# Patient Record
Sex: FEMALE | Race: ASIAN | NOT HISPANIC OR LATINO | Employment: UNEMPLOYED | URBAN - METROPOLITAN AREA
[De-identification: names, ages, dates, MRNs, and addresses within clinical notes are randomized per-mention and may not be internally consistent; named-entity substitution may affect disease eponyms.]

---

## 2019-10-18 ASSESSMENT — MIFFLIN-ST. JEOR
SCORE: 1232.57
SCORE: 1232.57

## 2019-10-20 ENCOUNTER — ANESTHESIA - HEALTHEAST (OUTPATIENT)
Dept: SURGERY | Facility: AMBULATORY SURGERY CENTER | Age: 24
End: 2019-10-20

## 2019-10-21 ENCOUNTER — HOSPITAL ENCOUNTER (OUTPATIENT)
Dept: SURGERY | Facility: AMBULATORY SURGERY CENTER | Age: 24
Discharge: HOME | End: 2019-10-21
Attending: OBSTETRICS & GYNECOLOGY | Admitting: OBSTETRICS & GYNECOLOGY

## 2019-10-21 ENCOUNTER — SURGERY - HEALTHEAST (OUTPATIENT)
Dept: SURGERY | Facility: AMBULATORY SURGERY CENTER | Age: 24
End: 2019-10-21

## 2019-10-21 LAB
DIPSTICK EXPIRATION DATE - HISTORICAL: NORMAL
DIPSTICK LOT NUMBER - HISTORICAL: NORMAL
POC PREG URINE (HCG) HE - HISTORICAL: NEGATIVE
POC SPECIFIC GRAVITY, URINE - HISTORICAL: NORMAL
POCT KIT EXPIRATION DATE - HISTORICAL: NORMAL
POCT KIT LOT NUMBER HE - HISTORICAL: NORMAL
POCT NEGATIVE CONTROL HE - HISTORICAL: NORMAL
POCT POSITIVE CONTROL HE - HISTORICAL: NORMAL

## 2019-10-21 ASSESSMENT — MIFFLIN-ST. JEOR
SCORE: 1232.57
SCORE: 1232.57

## 2021-01-28 ENCOUNTER — COMMUNICATION - HEALTHEAST (OUTPATIENT)
Dept: SCHEDULING | Facility: CLINIC | Age: 26
End: 2021-01-28

## 2021-06-02 NOTE — ANESTHESIA POSTPROCEDURE EVALUATION
Patient: Lumeena Demi  HYSTEROSCOPY INTRAUTERINE DEVICE REMOVAL, INTRAUTERINE DEVICE REMOVAL  Anesthesia type: MAC    Patient location: PACU  Last vitals:   Vitals Value Taken Time   /74 10/21/2019  9:45 AM   Temp 36.1  C (97  F) 10/21/2019  9:02 AM   Pulse 58 10/21/2019  9:48 AM   Resp 16 10/21/2019  9:43 AM   SpO2 98 % 10/21/2019  9:48 AM   Vitals shown include unvalidated device data.  Post vital signs: stable  Level of consciousness: awake and responds to simple questions  Post-anesthesia pain: pain controlled  Post-anesthesia nausea and vomiting: no  Pulmonary: unassisted, return to baseline  Cardiovascular: stable and blood pressure at baseline  Hydration: adequate  Anesthetic events: no    QCDR Measures:  ASA# 11 - Debra-op Cardiac Arrest: ASA11B - Patient did NOT experience unanticipated cardiac arrest  ASA# 12 - Debra-op Mortality Rate: ASA12B - Patient did NOT die  ASA# 13 - PACU Re-Intubation Rate: ASA13B - Patient did NOT require a new airway mgmt  ASA# 10 - Composite Anes Safety: ASA10A - No serious adverse event    Additional Notes:

## 2021-06-02 NOTE — ANESTHESIA CARE TRANSFER NOTE
Last vitals:   Vitals:    10/21/19 0902   BP: 98/57   Pulse: 66   Resp: 16   Temp: 36.1  C (97  F)   SpO2: 98%     Patient's level of consciousness is drowsy  Spontaneous respirations: yes  Maintains airway independently: yes  Dentition unchanged: yes  Oropharynx: oropharynx clear of all foreign objects    QCDR Measures:  ASA# 20 - Surgical Safety Checklist: WHO surgical safety checklist completed prior to induction    PQRS# 430 - Adult PONV Prevention: 4558F - Pt received => 2 anti-emetic agents (different classes) preop & intraop  ASA# 8 - Peds PONV Prevention: NA - Not pediatric patient, not GA or 2 or more risk factors NOT present  PQRS# 424 - Derba-op Temp Management: 4559F - At least one body temp DOCUMENTED => 35.5C or 95.9F within required timeframe  PQRS# 426 - PACU Transfer Protocol: - Transfer of care checklist used  ASA# 14 - Acute Post-op Pain: ASA14B - Patient did NOT experience pain >= 7 out of 10

## 2021-06-02 NOTE — ANESTHESIA PREPROCEDURE EVALUATION
Anesthesia Evaluation      Patient summary reviewed   No history of anesthetic complications     Airway   Mallampati: II  Neck ROM: full   Pulmonary - negative ROS and normal exam                          Cardiovascular - negative ROS   Neuro/Psych - negative ROS     Endo/Other - negative ROS      GI/Hepatic/Renal - negative ROS           Dental - normal exam                        Anesthesia Plan  Planned anesthetic: MAC    ASA 1   Induction: intravenous   Anesthetic plan and risks discussed with: patient    Post-op plan: routine recovery

## 2021-06-02 NOTE — H&P
"Assessment/Plan:     Pt is a 25yo here with retained IUD. Despite attempts in the office it was unable to be removed. The patient has had a tvus to confirm placement in the uterus. Strings were unable to be visualized. Attempts made in the office were unsuccessful at removal. IUD has been in place for 5 years. Will move forward with hysteroscopy and removal in the OR. Pt have been counseled on risks/benefits/adverse events. She agrees to the plan of care.         Counseling: Procedure, risks, reasons, benefits and complications (including injury to bowel, bladder, major blood vessel, ureter, bleeding, possibility of transfusion, infection, or fistula formation) reviewed in detail. Consent signed.  Preop testing ordered.        Subjective:     Patient is a 24 y.o. female scheduled for hysteroscopy removal of iud. Indications for procedure are failed removal in office..    Onset of symptoms was no symptoms .. Previous studies include TVUS which shoed in place IUD.    Pertinent Gynecological History:  Menses: absent with IUD  Bleeding: none  Contraception: IUD  DARYL exposure: denies  Blood transfusions: none  Sexually transmitted diseases: no past history  Last mammogram:  Date: N/A  Last pap: normal      Menstrual History:  OB History    No obstetric history on file.             The following portions of the patient's history were reviewed and updated as appropriate: allergies, current medications, past family history, past medical history, past social history, past surgical history and problem list.    Review of Systems  Pertinent items are noted in HPI.      Objective:     /71   Pulse 71   Temp 98  F (36.7  C) (Temporal)   Resp 16   Ht 5' 2\" (1.575 m)   Wt 120 lb (54.4 kg)   SpO2 99%   BMI 21.95 kg/m      General:   alert, appears stated age and cooperative   Skin:   normal   HEENT:  neck supple with midline trachea   Lungs:   non labored   Heart:   regular rate and rhythm, S1, S2 normal, no murmur, " click, rub or gallop   Breasts:   self-exam is taught and encouraged   Abdomen:  soft, non-tender; bowel sounds normal; no masses,  no organomegaly   Pelvis:  Exam deferred.  deffered to OR                                           Hayley Saavedra DO

## 2021-06-02 NOTE — OP NOTE
PROCEDURE NOTE - HYSTEROSCOPY AND DILATION AND CURETTAGE     Name: Godfrey Simms   : 1995   MRN: [id number]     DATE OF SERVICE:  10/21/2019     PREOPERATIVE DIAGNOSIS: Retained IUD    POSTOPERATIVE DIAGNOSIS:Retained IUD    PROCEDURES: Hysteroscopy, removal of IUD    SURGEON: Hayley Saavedra     ASSISTANT: OR staff    ANESTHESIA:  mac    ESTIMATED BLOOD LOSS:   5cc  HISTORY OF PRESENT ILLNESS:  This is a 24 y.o. female with history ofretained IUD. She had a transvaginal ultrasound prior to which showed   IUD in correct anatomical place in the uterus.  She was given informed consent for the above procedure. The risks, benefits and alternatives were discussed with her including but not exclusive to uterine perforation, bleeding and infection. She expressed understanding and wished to proceed.     PROCEDURE:  The patient was brought to the operating room and after induction of MAC anesthesia was prepped and draped in the dorsal lithotomy position. A time out was called and the patient and the procedure were verified.  A bimanual exam was done, indicating   6 wks midline uterus. No other abnormalities were noted.     A sterile weighted speculum was then placed. The anterior lip of the cervix was grasped with a single tooth tenaculum. Uterine cavity was then sounded to 6.5cm. The cervix was gently dilated using Elizabeth dilators and the hysteroscope was introduced without difficulty. The cavity of the uterus appeared within normal, the IUD was identifed with the strings curled around the device. The hysteroscope was removed.The device was then removed using polyp forceps. The hysteroscope was reinserted and the uterine cavity was visualized again, the bilateral ostia were identified and there were no abnormalities present.  At this point good hemostasis was noted and the hysteroscope was removed once again. Instruments were removed from vagina. No active bleeding was noted. Sponge and needle counts were correct X 2.  She was taken to recovery in stable condition.    Hayley Saavedra DO       CC: Provider, No Primary Care, Hayley Saavedra

## 2021-06-03 VITALS
HEIGHT: 62 IN | BODY MASS INDEX: 22.08 KG/M2 | WEIGHT: 120 LBS | HEIGHT: 62 IN | WEIGHT: 120 LBS | BODY MASS INDEX: 22.08 KG/M2

## 2021-06-16 PROBLEM — Z34.90 PREGNANT: Status: ACTIVE | Noted: 2021-01-28

## 2021-11-30 LAB
HEMOGLOBIN (EXTERNAL): 13.1 G/DL (ref 11.7–15.5)
HEPATITIS B SURFACE ANTIGEN (EXTERNAL): NONREACTIVE
HEPATITIS C ANTIBODY (EXTERNAL): NONREACTIVE
HIV1+2 AB SERPL QL IA: NONREACTIVE
RUBELLA ANTIBODY IGG (EXTERNAL): POSITIVE
VDRL (SYPHILIS) (EXTERNAL): NONREACTIVE

## 2021-12-01 ENCOUNTER — MEDICAL CORRESPONDENCE (OUTPATIENT)
Dept: HEALTH INFORMATION MANAGEMENT | Facility: CLINIC | Age: 26
End: 2021-12-01

## 2021-12-01 ENCOUNTER — TRANSCRIBE ORDERS (OUTPATIENT)
Dept: MATERNAL FETAL MEDICINE | Facility: HOSPITAL | Age: 26
End: 2021-12-01

## 2021-12-01 DIAGNOSIS — O26.90 PREGNANCY RELATED CONDITION, ANTEPARTUM: Primary | ICD-10-CM

## 2022-01-07 ENCOUNTER — TRANSFERRED RECORDS (OUTPATIENT)
Dept: HEALTH INFORMATION MANAGEMENT | Facility: CLINIC | Age: 27
End: 2022-01-07
Payer: COMMERCIAL

## 2022-01-10 ENCOUNTER — PRE VISIT (OUTPATIENT)
Dept: MATERNAL FETAL MEDICINE | Facility: HOSPITAL | Age: 27
End: 2022-01-10
Payer: COMMERCIAL

## 2022-01-17 ENCOUNTER — OFFICE VISIT (OUTPATIENT)
Dept: MATERNAL FETAL MEDICINE | Facility: HOSPITAL | Age: 27
End: 2022-01-17
Attending: MIDWIFE
Payer: COMMERCIAL

## 2022-01-17 ENCOUNTER — ANCILLARY PROCEDURE (OUTPATIENT)
Dept: ULTRASOUND IMAGING | Facility: HOSPITAL | Age: 27
End: 2022-01-17
Attending: MIDWIFE
Payer: COMMERCIAL

## 2022-01-17 DIAGNOSIS — O26.90 PREGNANCY RELATED CONDITION, ANTEPARTUM: Primary | ICD-10-CM

## 2022-01-17 DIAGNOSIS — O26.90 PREGNANCY RELATED CONDITION, ANTEPARTUM: ICD-10-CM

## 2022-01-17 PROCEDURE — 99207 PR NO CHARGE LOS: CPT | Performed by: OBSTETRICS & GYNECOLOGY

## 2022-01-17 PROCEDURE — 76805 OB US >/= 14 WKS SNGL FETUS: CPT

## 2022-01-17 PROCEDURE — 76805 OB US >/= 14 WKS SNGL FETUS: CPT | Mod: 26 | Performed by: OBSTETRICS & GYNECOLOGY

## 2022-01-17 NOTE — PROGRESS NOTES
"Please see \"Imaging\" tab under \"Chart Review\" for details of today's US.    Esperanza Matt, DO    "

## 2022-02-21 ENCOUNTER — ANCILLARY PROCEDURE (OUTPATIENT)
Dept: ULTRASOUND IMAGING | Facility: HOSPITAL | Age: 27
End: 2022-02-21
Attending: OBSTETRICS & GYNECOLOGY
Payer: COMMERCIAL

## 2022-02-21 ENCOUNTER — OFFICE VISIT (OUTPATIENT)
Dept: MATERNAL FETAL MEDICINE | Facility: HOSPITAL | Age: 27
End: 2022-02-21
Attending: OBSTETRICS & GYNECOLOGY
Payer: COMMERCIAL

## 2022-02-21 DIAGNOSIS — O35.9XX0 SUSPECTED FETAL ANOMALY, ANTEPARTUM, SINGLE OR UNSPECIFIED FETUS: Primary | ICD-10-CM

## 2022-02-21 DIAGNOSIS — O26.90 PREGNANCY RELATED CONDITION, ANTEPARTUM: ICD-10-CM

## 2022-02-21 PROCEDURE — 99207 PR NO CHARGE LOS: CPT | Performed by: OBSTETRICS & GYNECOLOGY

## 2022-02-21 PROCEDURE — 76811 OB US DETAILED SNGL FETUS: CPT

## 2022-02-21 PROCEDURE — 76811 OB US DETAILED SNGL FETUS: CPT | Mod: 26 | Performed by: OBSTETRICS & GYNECOLOGY

## 2022-02-23 NOTE — PROGRESS NOTES
"Please see \"Imaging\" tab under \"Chart Review\" for details of today's visit.    Pricilla Echavarria    "

## 2022-04-05 ENCOUNTER — HOSPITAL ENCOUNTER (OUTPATIENT)
Facility: HOSPITAL | Age: 27
End: 2022-04-05
Admitting: ADVANCED PRACTICE MIDWIFE
Payer: COMMERCIAL

## 2022-04-05 ENCOUNTER — HOSPITAL ENCOUNTER (OUTPATIENT)
Facility: HOSPITAL | Age: 27
Discharge: HOME | End: 2022-04-05
Attending: ADVANCED PRACTICE MIDWIFE | Admitting: ADVANCED PRACTICE MIDWIFE
Payer: COMMERCIAL

## 2022-04-05 VITALS
HEIGHT: 62 IN | WEIGHT: 135 LBS | DIASTOLIC BLOOD PRESSURE: 57 MMHG | SYSTOLIC BLOOD PRESSURE: 101 MMHG | BODY MASS INDEX: 24.84 KG/M2 | TEMPERATURE: 98.7 F | RESPIRATION RATE: 16 BRPM

## 2022-04-05 PROCEDURE — G0463 HOSPITAL OUTPT CLINIC VISIT: HCPCS

## 2022-04-05 RX ORDER — VITAMIN A ACETATE, .BETA.-CAROTENE, ASCORBIC ACID, CHOLECALCIFEROL, .ALPHA.-TOCOPHEROL ACETATE, DL-, THIAMINE MONONITRATE, RIBOFLAVIN, NIACINAMIDE, PYRIDOXINE HYDROCHLORIDE, FOLIC ACID, CYANOCOBALAMIN, CALCIUM CARBONATE, FERROUS FUMARATE, ZINC OXIDE, AND CUPRIC OXIDE 2000; 2000; 120; 400; 22; 1.84; 3; 20; 10; 1; 12; 200; 27; 25; 2 [IU]/1; [IU]/1; MG/1; [IU]/1; MG/1; MG/1; MG/1; MG/1; MG/1; MG/1; UG/1; MG/1; MG/1; MG/1; MG/1
1 TABLET ORAL DAILY
COMMUNITY

## 2022-04-05 NOTE — DISCHARGE INSTRUCTIONS
Discharge Instruction for Undelivered Patients      You were seen for: Bleeding Assessment  We Consulted:Suzanne Holguin CNM  You had (Test or Medicine):***     Diet:   Drink 8 to 12 glasses of liquids (milk, juice, water) every day.  You may eat meals and snacks.     Activity:  Count fetal kicks everyday (see handout)     Call your provider if you notice:  Swelling in your face or increased swelling in your hands or legs.  Headaches that are not relieved by Tylenol (acetaminophen).  Changes in your vision (blurring: seeing spots or stars.)  Nausea (sick to your stomach) and vomiting (throwing up).   Weight gain of 5 pounds or more per week.  Heartburn that doesn't go away.  Signs of bladder infection: pain when you urinate (use the toilet), need to go more often and more urgently.  The bag of bryan (rupture of membranes) breaks, or you notice leaking in your underwear.  Bright red blood in your underwear.  Abdominal (lower belly) or stomach pain.  For first baby: Contractions (tightening) less than 5 minutes apart for one hour or more.  Second (plus) baby: Contractions (tightening) less than 10 minutes apart and getting stronger.  *If less than 34 weeks: Contractions (tightening) more than 6 times in one hour.  Increase or change in vaginal discharge (note the color and amount)  Other: ***    Follow-up:  As scheduled in the clinic
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
## 2022-04-21 LAB — HEMOGLOBIN (EXTERNAL): 11.9 G/DL (ref 11.7–15.5)

## 2022-07-15 ENCOUNTER — TRANSFERRED RECORDS (OUTPATIENT)
Dept: HEALTH INFORMATION MANAGEMENT | Facility: CLINIC | Age: 27
End: 2022-07-15

## 2022-07-15 ENCOUNTER — HOSPITAL ENCOUNTER (INPATIENT)
Facility: HOSPITAL | Age: 27
LOS: 2 days | Discharge: HOME | End: 2022-07-17
Attending: ADVANCED PRACTICE MIDWIFE | Admitting: ADVANCED PRACTICE MIDWIFE
Payer: COMMERCIAL

## 2022-07-15 PROBLEM — O42.90 RUPTURE OF MEMBRANES WITH DELAY OF DELIVERY: Status: ACTIVE | Noted: 2022-07-15

## 2022-07-15 LAB
ABO/RH(D): NORMAL
ANTIBODY SCREEN: NEGATIVE
HOLD SPECIMEN: NORMAL
HOLD SPECIMEN: NORMAL
SARS-COV-2 RNA RESP QL NAA+PROBE: NEGATIVE
SPECIMEN EXPIRATION DATE: NORMAL
T PALLIDUM AB SER QL: NONREACTIVE

## 2022-07-15 PROCEDURE — 36415 COLL VENOUS BLD VENIPUNCTURE: CPT | Performed by: ADVANCED PRACTICE MIDWIFE

## 2022-07-15 PROCEDURE — 87635 SARS-COV-2 COVID-19 AMP PRB: CPT | Performed by: ADVANCED PRACTICE MIDWIFE

## 2022-07-15 PROCEDURE — 86780 TREPONEMA PALLIDUM: CPT | Performed by: ADVANCED PRACTICE MIDWIFE

## 2022-07-15 PROCEDURE — 250N000011 HC RX IP 250 OP 636: Performed by: ADVANCED PRACTICE MIDWIFE

## 2022-07-15 PROCEDURE — 250N000013 HC RX MED GY IP 250 OP 250 PS 637: Performed by: ADVANCED PRACTICE MIDWIFE

## 2022-07-15 PROCEDURE — 86901 BLOOD TYPING SEROLOGIC RH(D): CPT | Performed by: ADVANCED PRACTICE MIDWIFE

## 2022-07-15 PROCEDURE — 250N000009 HC RX 250: Performed by: ADVANCED PRACTICE MIDWIFE

## 2022-07-15 PROCEDURE — 722N000001 HC LABOR CARE VAGINAL DELIVERY SINGLE

## 2022-07-15 PROCEDURE — 258N000003 HC RX IP 258 OP 636: Performed by: ADVANCED PRACTICE MIDWIFE

## 2022-07-15 PROCEDURE — 120N000001 HC R&B MED SURG/OB

## 2022-07-15 RX ORDER — NALOXONE HYDROCHLORIDE 0.4 MG/ML
0.2 INJECTION, SOLUTION INTRAMUSCULAR; INTRAVENOUS; SUBCUTANEOUS
Status: DISCONTINUED | OUTPATIENT
Start: 2022-07-15 | End: 2022-07-15 | Stop reason: HOSPADM

## 2022-07-15 RX ORDER — OXYTOCIN 10 [USP'U]/ML
10 INJECTION, SOLUTION INTRAMUSCULAR; INTRAVENOUS
Status: DISCONTINUED | OUTPATIENT
Start: 2022-07-15 | End: 2022-07-17 | Stop reason: HOSPADM

## 2022-07-15 RX ORDER — NALOXONE HYDROCHLORIDE 0.4 MG/ML
0.4 INJECTION, SOLUTION INTRAMUSCULAR; INTRAVENOUS; SUBCUTANEOUS
Status: DISCONTINUED | OUTPATIENT
Start: 2022-07-15 | End: 2022-07-15 | Stop reason: HOSPADM

## 2022-07-15 RX ORDER — IBUPROFEN 800 MG/1
800 TABLET, FILM COATED ORAL
Status: DISCONTINUED | OUTPATIENT
Start: 2022-07-15 | End: 2022-07-17 | Stop reason: HOSPADM

## 2022-07-15 RX ORDER — MISOPROSTOL 200 UG/1
800 TABLET ORAL
Status: DISCONTINUED | OUTPATIENT
Start: 2022-07-15 | End: 2022-07-15 | Stop reason: HOSPADM

## 2022-07-15 RX ORDER — MISOPROSTOL 200 UG/1
400 TABLET ORAL
Status: DISCONTINUED | OUTPATIENT
Start: 2022-07-15 | End: 2022-07-17 | Stop reason: HOSPADM

## 2022-07-15 RX ORDER — MISOPROSTOL 200 UG/1
800 TABLET ORAL
Status: DISCONTINUED | OUTPATIENT
Start: 2022-07-15 | End: 2022-07-17 | Stop reason: HOSPADM

## 2022-07-15 RX ORDER — PROCHLORPERAZINE MALEATE 10 MG
10 TABLET ORAL EVERY 6 HOURS PRN
Status: DISCONTINUED | OUTPATIENT
Start: 2022-07-15 | End: 2022-07-15 | Stop reason: HOSPADM

## 2022-07-15 RX ORDER — ACETAMINOPHEN 500 MG
500-1000 TABLET ORAL EVERY 6 HOURS PRN
Status: ON HOLD | COMMUNITY
End: 2023-10-23

## 2022-07-15 RX ORDER — ONDANSETRON 2 MG/ML
4 INJECTION INTRAMUSCULAR; INTRAVENOUS EVERY 6 HOURS PRN
Status: DISCONTINUED | OUTPATIENT
Start: 2022-07-15 | End: 2022-07-15 | Stop reason: HOSPADM

## 2022-07-15 RX ORDER — CARBOPROST TROMETHAMINE 250 UG/ML
250 INJECTION, SOLUTION INTRAMUSCULAR
Status: DISCONTINUED | OUTPATIENT
Start: 2022-07-15 | End: 2022-07-17 | Stop reason: HOSPADM

## 2022-07-15 RX ORDER — NALOXONE HYDROCHLORIDE 0.4 MG/ML
0.4 INJECTION, SOLUTION INTRAMUSCULAR; INTRAVENOUS; SUBCUTANEOUS
Status: DISCONTINUED | OUTPATIENT
Start: 2022-07-15 | End: 2022-07-17 | Stop reason: HOSPADM

## 2022-07-15 RX ORDER — OXYTOCIN/0.9 % SODIUM CHLORIDE 30/500 ML
340 PLASTIC BAG, INJECTION (ML) INTRAVENOUS CONTINUOUS PRN
Status: DISCONTINUED | OUTPATIENT
Start: 2022-07-15 | End: 2022-07-17 | Stop reason: HOSPADM

## 2022-07-15 RX ORDER — CARBOPROST TROMETHAMINE 250 UG/ML
250 INJECTION, SOLUTION INTRAMUSCULAR
Status: DISCONTINUED | OUTPATIENT
Start: 2022-07-15 | End: 2022-07-15 | Stop reason: HOSPADM

## 2022-07-15 RX ORDER — PENICILLIN G 3000000 [IU]/50ML
3 INJECTION, SOLUTION INTRAVENOUS EVERY 4 HOURS
Status: DISCONTINUED | OUTPATIENT
Start: 2022-07-15 | End: 2022-07-15 | Stop reason: HOSPADM

## 2022-07-15 RX ORDER — KETOROLAC TROMETHAMINE 30 MG/ML
30 INJECTION, SOLUTION INTRAMUSCULAR; INTRAVENOUS
Status: DISCONTINUED | OUTPATIENT
Start: 2022-07-15 | End: 2022-07-17 | Stop reason: HOSPADM

## 2022-07-15 RX ORDER — ACETAMINOPHEN 325 MG/1
650 TABLET ORAL EVERY 4 HOURS PRN
Status: DISCONTINUED | OUTPATIENT
Start: 2022-07-15 | End: 2022-07-15 | Stop reason: HOSPADM

## 2022-07-15 RX ORDER — OXYTOCIN 10 [USP'U]/ML
10 INJECTION, SOLUTION INTRAMUSCULAR; INTRAVENOUS
Status: DISCONTINUED | OUTPATIENT
Start: 2022-07-15 | End: 2022-07-15 | Stop reason: HOSPADM

## 2022-07-15 RX ORDER — HYDROCORTISONE 25 MG/G
CREAM TOPICAL 3 TIMES DAILY PRN
Status: DISCONTINUED | OUTPATIENT
Start: 2022-07-15 | End: 2022-07-17 | Stop reason: HOSPADM

## 2022-07-15 RX ORDER — ONDANSETRON 4 MG/1
4 TABLET, ORALLY DISINTEGRATING ORAL EVERY 6 HOURS PRN
Status: DISCONTINUED | OUTPATIENT
Start: 2022-07-15 | End: 2022-07-15 | Stop reason: HOSPADM

## 2022-07-15 RX ORDER — IBUPROFEN 800 MG/1
800 TABLET, FILM COATED ORAL EVERY 6 HOURS PRN
Status: DISCONTINUED | OUTPATIENT
Start: 2022-07-15 | End: 2022-07-17 | Stop reason: HOSPADM

## 2022-07-15 RX ORDER — OXYTOCIN/0.9 % SODIUM CHLORIDE 30/500 ML
340 PLASTIC BAG, INJECTION (ML) INTRAVENOUS CONTINUOUS PRN
Status: DISCONTINUED | OUTPATIENT
Start: 2022-07-15 | End: 2022-07-15 | Stop reason: HOSPADM

## 2022-07-15 RX ORDER — OXYCODONE HYDROCHLORIDE 5 MG/1
5 TABLET ORAL EVERY 4 HOURS PRN
Status: DISCONTINUED | OUTPATIENT
Start: 2022-07-15 | End: 2022-07-17 | Stop reason: HOSPADM

## 2022-07-15 RX ORDER — CITRIC ACID/SODIUM CITRATE 334-500MG
30 SOLUTION, ORAL ORAL
Status: DISCONTINUED | OUTPATIENT
Start: 2022-07-15 | End: 2022-07-15 | Stop reason: HOSPADM

## 2022-07-15 RX ORDER — METOCLOPRAMIDE HYDROCHLORIDE 5 MG/ML
10 INJECTION INTRAMUSCULAR; INTRAVENOUS EVERY 6 HOURS PRN
Status: DISCONTINUED | OUTPATIENT
Start: 2022-07-15 | End: 2022-07-15 | Stop reason: HOSPADM

## 2022-07-15 RX ORDER — MODIFIED LANOLIN
OINTMENT (GRAM) TOPICAL
Status: DISCONTINUED | OUTPATIENT
Start: 2022-07-15 | End: 2022-07-17 | Stop reason: HOSPADM

## 2022-07-15 RX ORDER — METHYLERGONOVINE MALEATE 0.2 MG/ML
200 INJECTION INTRAVENOUS
Status: DISCONTINUED | OUTPATIENT
Start: 2022-07-15 | End: 2022-07-15 | Stop reason: HOSPADM

## 2022-07-15 RX ORDER — MISOPROSTOL 200 UG/1
400 TABLET ORAL
Status: DISCONTINUED | OUTPATIENT
Start: 2022-07-15 | End: 2022-07-15 | Stop reason: HOSPADM

## 2022-07-15 RX ORDER — NALOXONE HYDROCHLORIDE 0.4 MG/ML
0.2 INJECTION, SOLUTION INTRAMUSCULAR; INTRAVENOUS; SUBCUTANEOUS
Status: DISCONTINUED | OUTPATIENT
Start: 2022-07-15 | End: 2022-07-17 | Stop reason: HOSPADM

## 2022-07-15 RX ORDER — ACETAMINOPHEN 325 MG/1
650 TABLET ORAL EVERY 4 HOURS PRN
Status: DISCONTINUED | OUTPATIENT
Start: 2022-07-15 | End: 2022-07-17 | Stop reason: HOSPADM

## 2022-07-15 RX ORDER — PENICILLIN G POTASSIUM 5000000 [IU]/1
5 INJECTION, POWDER, FOR SOLUTION INTRAMUSCULAR; INTRAVENOUS ONCE
Status: COMPLETED | OUTPATIENT
Start: 2022-07-15 | End: 2022-07-15

## 2022-07-15 RX ORDER — PROCHLORPERAZINE 25 MG
25 SUPPOSITORY, RECTAL RECTAL EVERY 12 HOURS PRN
Status: DISCONTINUED | OUTPATIENT
Start: 2022-07-15 | End: 2022-07-15 | Stop reason: HOSPADM

## 2022-07-15 RX ORDER — DOCUSATE SODIUM 100 MG/1
100 CAPSULE, LIQUID FILLED ORAL DAILY
Status: DISCONTINUED | OUTPATIENT
Start: 2022-07-15 | End: 2022-07-17 | Stop reason: HOSPADM

## 2022-07-15 RX ORDER — OXYTOCIN/0.9 % SODIUM CHLORIDE 30/500 ML
100-340 PLASTIC BAG, INJECTION (ML) INTRAVENOUS CONTINUOUS PRN
Status: DISCONTINUED | OUTPATIENT
Start: 2022-07-15 | End: 2022-07-17 | Stop reason: HOSPADM

## 2022-07-15 RX ORDER — METOCLOPRAMIDE 10 MG/1
10 TABLET ORAL EVERY 6 HOURS PRN
Status: DISCONTINUED | OUTPATIENT
Start: 2022-07-15 | End: 2022-07-15 | Stop reason: HOSPADM

## 2022-07-15 RX ORDER — BISACODYL 10 MG
10 SUPPOSITORY, RECTAL RECTAL DAILY PRN
Status: DISCONTINUED | OUTPATIENT
Start: 2022-07-15 | End: 2022-07-17 | Stop reason: HOSPADM

## 2022-07-15 RX ORDER — METHYLERGONOVINE MALEATE 0.2 MG/ML
200 INJECTION INTRAVENOUS
Status: DISCONTINUED | OUTPATIENT
Start: 2022-07-15 | End: 2022-07-17 | Stop reason: HOSPADM

## 2022-07-15 RX ORDER — SODIUM CHLORIDE, SODIUM LACTATE, POTASSIUM CHLORIDE, CALCIUM CHLORIDE 600; 310; 30; 20 MG/100ML; MG/100ML; MG/100ML; MG/100ML
INJECTION, SOLUTION INTRAVENOUS CONTINUOUS
Status: DISCONTINUED | OUTPATIENT
Start: 2022-07-15 | End: 2022-07-15 | Stop reason: HOSPADM

## 2022-07-15 RX ADMIN — PENICILLIN G POTASSIUM 5 MILLION UNITS: 5000000 POWDER, FOR SOLUTION INTRAMUSCULAR; INTRAPLEURAL; INTRATHECAL; INTRAVENOUS at 02:43

## 2022-07-15 RX ADMIN — DOCUSATE SODIUM 100 MG: 100 CAPSULE, LIQUID FILLED ORAL at 14:30

## 2022-07-15 RX ADMIN — ACETAMINOPHEN 650 MG: 325 TABLET, FILM COATED ORAL at 07:13

## 2022-07-15 RX ADMIN — IBUPROFEN 800 MG: 800 TABLET ORAL at 14:30

## 2022-07-15 RX ADMIN — SODIUM CHLORIDE, POTASSIUM CHLORIDE, SODIUM LACTATE AND CALCIUM CHLORIDE: 600; 310; 30; 20 INJECTION, SOLUTION INTRAVENOUS at 02:25

## 2022-07-15 RX ADMIN — Medication 340 ML/HR: at 03:32

## 2022-07-15 RX ADMIN — KETOROLAC TROMETHAMINE 30 MG: 30 INJECTION, SOLUTION INTRAMUSCULAR; INTRAVENOUS at 04:04

## 2022-07-15 RX ADMIN — IBUPROFEN 800 MG: 800 TABLET ORAL at 20:48

## 2022-07-15 RX ADMIN — ACETAMINOPHEN 650 MG: 325 TABLET, FILM COATED ORAL at 14:52

## 2022-07-15 RX ADMIN — ACETAMINOPHEN 650 MG: 325 TABLET, FILM COATED ORAL at 20:48

## 2022-07-15 ASSESSMENT — ACTIVITIES OF DAILY LIVING (ADL)
ADLS_ACUITY_SCORE: 22
WEAR_GLASSES_OR_BLIND: NO
ADLS_ACUITY_SCORE: 22
CHANGE_IN_FUNCTIONAL_STATUS_SINCE_ONSET_OF_CURRENT_ILLNESS/INJURY: NO
DRESSING/BATHING_DIFFICULTY: NO
FALL_HISTORY_WITHIN_LAST_SIX_MONTHS: NO
ADLS_ACUITY_SCORE: 22
ADLS_ACUITY_SCORE: 35
DIFFICULTY_EATING/SWALLOWING: NO
DOING_ERRANDS_INDEPENDENTLY_DIFFICULTY: NO
ADLS_ACUITY_SCORE: 22
WALKING_OR_CLIMBING_STAIRS_DIFFICULTY: NO
TOILETING_ISSUES: NO
ADLS_ACUITY_SCORE: 22
CONCENTRATING,_REMEMBERING_OR_MAKING_DECISIONS_DIFFICULTY: NO
ADLS_ACUITY_SCORE: 22

## 2022-07-15 NOTE — PLAN OF CARE
Godfrey is dizzy when she sits up. She was able to void x1 mid morning, used sit to stand to get to bathroom. Ate meal midmorning. Has been trying to rest most of the day but hasnt been able to sleep. Plan is to nap and re-assess if she is still feeling dizzy when she gets up.     Postpartum assessment otherwise WNL. Pain managed with Ibuprofen and Tylenol PRN.     PPMA, BC and ROP need to be completed- at bedside. Tdap up to date.     Patient has been instructed to use the call light if she needs to get up or if/when baby needs anything. She is alone this afternoon, her  is coming back later today.       Problem: Bleeding (Postpartum Vaginal Delivery)  Goal: Hemostasis  Outcome: Ongoing, Progressing     Problem: Infection (Postpartum Vaginal Delivery)  Goal: Absence of Infection Signs/Symptoms  Outcome: Ongoing, Progressing

## 2022-07-15 NOTE — PLAN OF CARE
Godfrey napped this afternoon for approximately 2 hours and feels much better.  Her  has been at the bedside this afternoon and attentive.      Pt reporting painful cramping at a 7/10.  Pain otherwise was an acceptable 3/10.  Fundus was found to be to right of umbilicus and firm following fundal massage.  Large clots expressed.  Pt then emptied her bladder and fundus was firm and midline, bleeding was minimal with no clots, and pain from cramping was greatly reduced.  Pt encouraged to empty bladder frequently, every 1-2 hours, this evening, and report any additional clots or painful cramping.  Saline lock left in place for now.        Problem: Bleeding (Postpartum Vaginal Delivery)  Goal: Hemostasis  Outcome: Ongoing, Progressing

## 2022-07-15 NOTE — H&P
HISTORY AND PHYSICAL UPDATE ADMISSION EXAM    Name: Godfrey Simms  YOB: 1995  Medical Record Number: [id number]    History of Present Illness: Godfrey Simms is a 27 year old female who is 39w6d pregnant and being admitted for active labor management, SROM and antibiotic therapy.    Estimated Date of Delivery: Data Unavailable    EGA 39w6d    OB History    Para Term  AB Living   6 4 4 0 2 4   SAB IAB Ectopic Multiple Live Births   2 0 0 0 4      # Outcome Date GA Lbr Abraham/2nd Weight Sex Delivery Anes PTL Lv   6 Term 07/15/22 39w6d 06:24 / 00:02 3.5 kg (7 lb 11.5 oz) M Vag-Spont None N RANJAN      Name: BEAU SIMMS      Apgar1: 7  Apgar5: 8   5 Term 21 39w6d 04:00 / 00:12 3.68 kg (8 lb 1.8 oz) M Vag-Spont None N RANJAN      Name: BEAU SIMMS      Apgar1: 9  Apgar5: 9   4 SAB 2020     SAB      3 Term 2014 41w0d  3.487 kg (7 lb 11 oz)     RANJAN   2 Term 2013 42w0d  3.487 kg (7 lb 11 oz)     RANJAN   1 2012     SAB           Lab Results   Component Value Date    AS Negative 07/15/2022    HGB 12.6 2021       Prenatal Complications: GBS pos in urine    Exam:      /80   Temp 99.3  F (37.4  C) (Oral)   Resp 16   LMP 2021   Breastfeeding Unknown     Fetal heart Rate Category 1  Contractions q 2 minutes    HEENT grossly normal  Neck: no lymphadenopathy or thryoidomegaly  Lungs no resp distress  Heart RRR  ABD gravid, non-tender  EXT:  mild edema, moves freely  Vaginal exam 7cm per RN  Membranes: SROM  clear amniotic fluid    Assessment: active labor management, SROM and antibiotic therapy    Plan: Admit - see IP orders  Prophylactic antibiotic for + GBS status  MD consultant on call Dr Arguello/ available prn  Anticipate     Prenatal record reviewed.    MARIN Joyce CNM      7/15/2022   4:32 AM

## 2022-07-15 NOTE — L&D DELIVERY NOTE
Vaginal Delivery Note    Name: Godfrey Simms  : 1995  MRN: [id number]    PRE DELIVERY DIAGNOSIS  1) 27 year old  6Para 4024 at 39w6d      2) GBS pos in urine    POST DELIVERY DIAGNOSIS  1) 27 year old  @ 39w6d  2) Delivery of a viable infant weighing  7#11oz via     YOB: 2023     Birth Time: 3:26 AM       Augmentation No              Indication: none  In    duction No                      Indication:none    Monitors: External     GBS: Positive    ROM: SROM  Fluid Type: Clear    Labor Analgesia/Anesthesia:Non-pharmacologic    Cord pH obtained: No  Placenta Date/Time: 7/15/2022  3:30 AM   Placenta submitted to Pathology: No    Description of procedure:   27 year old  with PNC w/ MWC and pregnancy complicated by GBS  pos in urine presented to L&D with spontaneous rupture of membranes and labor contractions, 7cm.  Her hospital course was uncomplicated.  Patient progressed to complete with position changes.  Shoulder Dystocia No  Operative Vaginal Delivery No  Infant spontaneously delivered over an intact perineum.   Infant delivered in PAWEL position.  Nuchal cord yes   Delivered through    Placenta spontaneously delivered: 7/15/2022  3:30 AM  grossly intact with 3 vessel cord.  Infant:  Live, vigorous infant  was handed to mom.    Delivery was complicated by nothing Interventions included fundal massage and pitocin.    Delivery QBL (mL): 100    Mother and Infant stable.    MARIN Joyce [facility]7/15/2022 4:25 AM

## 2022-07-15 NOTE — PLAN OF CARE
Problem: Plan of Care - These are the overarching goals to be used throughout the patient stay.    Goal: Plan of Care Review/Shift Note  Description: The Plan of Care Review/Shift note should be completed every shift.  The Outcome Evaluation is a brief statement about your assessment that the patient is improving, declining, or no change.  This information will be displayed automatically on your shift note.  Outcome: Ongoing, Progressing     Problem: Plan of Care - These are the overarching goals to be used throughout the patient stay.    Goal: Optimal Comfort and Wellbeing  Outcome: Ongoing, Progressing  Intervention: Monitor Pain and Promote Comfort  Recent Flowsheet Documentation  Taken 7/15/2022 0530 by Kiersten Plascencia RN  Pain Management Interventions: care clustered  Intervention: Provide Person-Centered Care  Recent Flowsheet Documentation  Taken 7/15/2022 0311 by Kiersten Plascencia RN  Trust Relationship/Rapport:   care explained   choices provided   emotional support provided   empathic listening provided   questions answered   questions encouraged   reassurance provided   thoughts/feelings acknowledged     Problem: Adjustment to Role Transition (Postpartum Vaginal Delivery)  Goal: Successful Maternal Role Transition  Outcome: Ongoing, Progressing  Intervention: Support Maternal Role Transition  Recent Flowsheet Documentation  Taken 7/15/2022 0318 by Kiersten Plascencia RN  Supportive Measures:   verbalization of feelings encouraged   positive reinforcement provided   active listening utilized  Parent/Child Attachment Promotion:   face-to-face positioning promoted   interaction encouraged   positive reinforcement provided   rooming-in promoted   strengths emphasized   parent/caregiver presence encouraged   participation in care promoted   cue recognition promoted     Problem: Bleeding (Postpartum Vaginal Delivery)  Goal: Hemostasis  Outcome: Ongoing, Progressing  Intervention: Monitor and Manage Postpartum  Bleeding  Recent Flowsheet Documentation  Taken 7/15/2022 0432 by Kiersten Plascencia RN   Bleed Management: Rh status confirmed     Problem: Pain (Postpartum Vaginal Delivery)  Goal: Acceptable Pain Control  Outcome: Ongoing, Progressing  Intervention: Prevent or Manage Pain  Recent Flowsheet Documentation  Taken 7/15/2022 0530 by Kiersten Plascencia RN  Pain Management Interventions: care clustered   Pt presented @ 39w6d with SROM cl fl and in active labor. GBS+ PCN was started but pt delivered before completion. VSS, NST reactive and CNM requested intermittent FHT. Pt progressed quickly. , intact perineum, given Toradol IV states has a little uterine cramping. FOB is present and supportive, good family bonding and support observed. Stable postpartum recovery, pt was incontinent of urine in labor and is due to void. Will call when ready to get up.

## 2022-07-16 PROCEDURE — 120N000001 HC R&B MED SURG/OB

## 2022-07-16 PROCEDURE — 250N000013 HC RX MED GY IP 250 OP 250 PS 637: Performed by: ADVANCED PRACTICE MIDWIFE

## 2022-07-16 RX ORDER — IBUPROFEN 800 MG/1
800 TABLET, FILM COATED ORAL EVERY 8 HOURS PRN
Qty: 30 TABLET | Refills: 0 | Status: ON HOLD | OUTPATIENT
Start: 2022-07-16 | End: 2023-10-22

## 2022-07-16 RX ORDER — DOCUSATE SODIUM 100 MG/1
100-200 CAPSULE, LIQUID FILLED ORAL 2 TIMES DAILY PRN
Qty: 30 CAPSULE | Refills: 0 | Status: ON HOLD | OUTPATIENT
Start: 2022-07-16 | End: 2023-10-22

## 2022-07-16 RX ADMIN — IBUPROFEN 800 MG: 800 TABLET ORAL at 15:49

## 2022-07-16 RX ADMIN — DOCUSATE SODIUM 100 MG: 100 CAPSULE, LIQUID FILLED ORAL at 09:12

## 2022-07-16 RX ADMIN — ACETAMINOPHEN 650 MG: 325 TABLET, FILM COATED ORAL at 01:42

## 2022-07-16 RX ADMIN — IBUPROFEN 800 MG: 800 TABLET ORAL at 09:12

## 2022-07-16 RX ADMIN — ACETAMINOPHEN 650 MG: 325 TABLET, FILM COATED ORAL at 17:38

## 2022-07-16 RX ADMIN — IBUPROFEN 800 MG: 800 TABLET ORAL at 03:35

## 2022-07-16 ASSESSMENT — ACTIVITIES OF DAILY LIVING (ADL)
ADLS_ACUITY_SCORE: 22

## 2022-07-16 NOTE — PROGRESS NOTES
"Vaginal Delivery Postpartum Day 1    Patient Name:  Godfrey Simms  :      1995  MRN:      [id number]      Assessment:  Normal postpartum course.    Plan:  Continue current care.    Subjective:  The patient feels well:  Voiding without difficulty, lochia normal, tolerating normal diet, and passing flatus.  Pain is well controlled with current medications.  The patient has no emotional concerns.  The baby is well and being fed by bottle.    Objective:  /63 (BP Location: Right arm)   Pulse 70   Temp 97.9  F (36.6  C) (Oral)   Resp 16   Ht 1.575 m (5' 2\")   Wt 66.2 kg (146 lb)   LMP 2021   SpO2 98%   Breastfeeding Unknown   BMI 26.70 kg/m    Patient Vitals for the past 24 hrs:   BP Temp Temp src Pulse Resp SpO2   22 0755 103/63 97.9  F (36.6  C) Oral 70 16 98 %   22 0142 110/64 98  F (36.7  C) Oral 74 14 --   07/15/22 1700 115/79 97.8  F (36.6  C) Oral -- 16 98 %   07/15/22 1346 102/59 98.1  F (36.7  C) Oral -- 16 96 %       The amount and color of the lochia is appropriate for the duration of recovery.  The uterine fundus is firm.  Urinary output is adequate.     Lab Results   Component Value Date    AS Negative 07/15/2022    HGB 12.6 2021       Immunization History   Administered Date(s) Administered     COVID-19,PF,Moderna 2021, 2021     DTaP, Unspecified 1994, 1996, 1998, 1999     Flu, Unspecified 10/08/2020     HPV Quadrivalent 2011     HepA, Unspecified 2011     HepB, Unspecified 1994, 1995, 1995     Hib, Unspecified 1994, 1996, 10/13/1996     Influenza Vaccine, 6+MO IM (QUADRIVALENT W/PRESERVATIVES) 2011     MMR 1994, 1996, 1998     Meningococcal (Menactra ) 2011     Poliovirus, inactivated (IPV) 1994, 1996, 1998, 1999     Td,adult,historic,unspecified 1994     Tdap (Adacel,Boostrix) 10/24/2007     Varicella 1999, 10/24/2007 "       Provider:  HDeJarnettCNM    Date:  2022  Time:  10:00 AM    Patient Name:  Godfrey Simms  :  1995  MRN:  [id number]

## 2022-07-16 NOTE — DISCHARGE SUMMARY
OB Discharge Summary      Date:  2022    Name:  Godfrey Simms  :  1995  MRN:  [id number]      Admission Date:  7/15/2022  Delivery Date: 7/15/22  Gestational Age at Delivery:  39w6d  Discharge Date:  2022    Principal Diagnosis:    Patient Active Problem List   Diagnosis     Pregnant     Rupture of membranes with delay of delivery       Conditions complicating Pregnancy: none    Procedure(s) Performed: NA    Indication for : NA  Indication for Induction:  NA     Condition at Discharge:  Stable    Discharge Medications:      Review of your medicines      START taking      Dose / Directions   docusate sodium 100 MG capsule  Commonly known as: COLACE  Used for: Normal delivery      Dose: 100-200 mg  Take 1-2 capsules (100-200 mg) by mouth 2 times daily as needed for constipation  Quantity: 30 capsule  Refills: 0     ibuprofen 800 MG tablet  Commonly known as: ADVIL/MOTRIN  Used for: Normal delivery      Dose: 800 mg  Take 1 tablet (800 mg) by mouth every 8 hours as needed for other (cramping)  Quantity: 30 tablet  Refills: 0        CONTINUE these medicines which have NOT CHANGED      Dose / Directions   acetaminophen 500 MG tablet  Commonly known as: TYLENOL      Dose: 500-1,000 mg  Take 500-1,000 mg by mouth every 6 hours as needed for mild pain  Refills: 0     PNV Prenatal Plus Multivitamin 27-1 MG Tabs per tablet      Dose: 1 tablet  Take 1 tablet by mouth daily  Refills: 0           Where to get your medicines      [id number] [place] [address] [facility] [address]  [address] [facility] [address]    Phone: [phone]     docusate sodium 100 MG capsule    ibuprofen 800 MG tablet          Discharge Plan:    Follow up with /CNM:  Dao orozco   Patient Instructions:      Physical activity: as tolerated    Diet:  As tolerated    Medication:  See list    Other:        Physician/CNM:  Kaitlyn Gabriel  CNM    Name:  Godfrey Simms  :  1995  MRN:  [id number]

## 2022-07-16 NOTE — PLAN OF CARE
Patient is managing pain with Ibuprofen and Tylenol.   Postpartum assessment WNL.   Tdap up to date.   Still needs to complete mood assessment and birth certificate.     Problem: Bleeding (Postpartum Vaginal Delivery)  Goal: Hemostasis  Outcome: Ongoing, Progressing     Problem: Infection (Postpartum Vaginal Delivery)  Goal: Absence of Infection Signs/Symptoms  Outcome: Ongoing, Progressing     Problem: Pain (Postpartum Vaginal Delivery)  Goal: Acceptable Pain Control  Outcome: Ongoing, Progressing  Intervention: Prevent or Manage Pain  Recent Flowsheet Documentation  Taken 7/16/2022 0335 by Maegan Jalloh, RN  Pain Management Interventions:   medication (see MAR)   repositioned   rest  Taken 7/16/2022 0142 by Maegan Jalloh, RN  Pain Management Interventions:   medication (see MAR)   repositioned   rest     Problem: Adjustment to Role Transition (Postpartum Vaginal Delivery)  Goal: Successful Maternal Role Transition  Outcome: Met     Problem: Urinary Retention (Postpartum Vaginal Delivery)  Goal: Effective Urinary Elimination  Outcome: Met

## 2022-07-16 NOTE — PLAN OF CARE
Problem: Plan of Care - These are the overarching goals to be used throughout the patient stay.    Goal: Plan of Care Review/Shift Note  Description: The Plan of Care Review/Shift note should be completed every shift.  The Outcome Evaluation is a brief statement about your assessment that the patient is improving, declining, or no change.  This information will be displayed automatically on your shift note.  Outcome: Ongoing, Progressing  Flowsheets (Taken 7/16/2022 1415)  Plan of Care Reviewed With: patient   VSS    Minimal pain. Medicated with prn ibuprofen x 1 this AM. Pt declined tylenol but knows it is available if needed.     Up independently in room.    Fundus firm, -2U.  Lochia light, no clots.    Interacting/bonding well with baby, formula/bottle feeding. Participating in cares. FOB present and attentive to pt.

## 2022-07-17 VITALS
BODY MASS INDEX: 26.87 KG/M2 | OXYGEN SATURATION: 97 % | HEART RATE: 73 BPM | HEIGHT: 62 IN | TEMPERATURE: 98 F | DIASTOLIC BLOOD PRESSURE: 75 MMHG | RESPIRATION RATE: 16 BRPM | SYSTOLIC BLOOD PRESSURE: 118 MMHG | WEIGHT: 146 LBS

## 2022-07-17 PROCEDURE — 250N000013 HC RX MED GY IP 250 OP 250 PS 637: Performed by: ADVANCED PRACTICE MIDWIFE

## 2022-07-17 RX ADMIN — ACETAMINOPHEN 650 MG: 325 TABLET, FILM COATED ORAL at 12:49

## 2022-07-17 RX ADMIN — ACETAMINOPHEN 650 MG: 325 TABLET, FILM COATED ORAL at 01:21

## 2022-07-17 RX ADMIN — IBUPROFEN 800 MG: 800 TABLET ORAL at 05:59

## 2022-07-17 RX ADMIN — DOCUSATE SODIUM 100 MG: 100 CAPSULE, LIQUID FILLED ORAL at 12:45

## 2022-07-17 RX ADMIN — ACETAMINOPHEN 650 MG: 325 TABLET, FILM COATED ORAL at 05:59

## 2022-07-17 RX ADMIN — IBUPROFEN 800 MG: 800 TABLET ORAL at 12:48

## 2022-07-17 ASSESSMENT — ACTIVITIES OF DAILY LIVING (ADL)
ADLS_ACUITY_SCORE: 22
ADLS_ACUITY_SCORE: 22
ADLS_ACUITY_SCORE: 18
ADLS_ACUITY_SCORE: 22

## 2022-07-17 NOTE — PROGRESS NOTES
"Vaginal Delivery Postpartum Day 2    Patient Name:  Godfrey Simms  :      1995  MRN:      [id number]      Assessment:  Normal postpartum course.    Plan:  Continue current care. Discharge to home.     Subjective:  The patient feels well:  Voiding without difficulty, lochia normal, tolerating normal diet, and passing flatus.  She denies headache, vision changes, URQ/epigastric pain, dizziness, lightheadedness, shortness of breath, and tachycardia. Pain is well controlled with current medications.  The patient has no emotional concerns.  The baby is well and being fed by breast.    Objective:  /60 (BP Location: Right arm)   Pulse 75   Temp 98.7  F (37.1  C) (Oral)   Resp 18   Ht 1.575 m (5' 2\")   Wt 66.2 kg (146 lb)   LMP 2021   SpO2 97%   Breastfeeding Unknown   BMI 26.70 kg/m    Patient Vitals for the past 24 hrs:   BP Temp Temp src Pulse Resp SpO2   22 0010 110/60 98.7  F (37.1  C) Oral 75 18 97 %   22 1944 107/63 97.8  F (36.6  C) Oral 64 20 98 %   22 1600 90/51 98.3  F (36.8  C) Oral 70 18 98 %       The amount and color of the lochia is appropriate for the duration of recovery.  The uterine fundus is firm.  Urinary output is adequate.     Lab Results   Component Value Date    AS Negative 07/15/2022    HGB 12.6 2021       Immunization History   Administered Date(s) Administered     COVID-19,PF,Moderna 2021, 2021     DTaP, Unspecified 1994, 1996, 1998, 1999     Flu, Unspecified 10/08/2020     HPV Quadrivalent 2011     HepA, Unspecified 2011     HepB, Unspecified 1994, 1994, 1994     Hib, Unspecified 1994, 1996, 10/13/1996     Influenza Vaccine, 6+MO IM (QUADRIVALENT W/PRESERVATIVES) 2011     MMR 1994, 1996, 1998     Meningococcal (Menactra ) 2011     Poliovirus, inactivated (IPV) 1994, 1996, 1998, 1999     " Td,adult,historic,unspecified 1994     Tdap (Adacel,Boostrix) 10/24/2007     Varicella 1999, 10/24/2007       Provider:  MARIN Acuña CNM     Date:  2022  Time:  11:36 AM    Patient Name:  Godfrey Simms  :  1995  MRN:  [id number]

## 2022-07-17 NOTE — PLAN OF CARE
"  Problem: Plan of Care - These are the overarching goals to be used throughout the patient stay.    Goal: Plan of Care Review/Shift Note  Description: The Plan of Care Review/Shift note should be completed every shift.  The Outcome Evaluation is a brief statement about your assessment that the patient is improving, declining, or no change.  This information will be displayed automatically on your shift note.  Outcome: Met     Problem: Plan of Care - These are the overarching goals to be used throughout the patient stay.    Goal: Patient-Specific Goal (Individualized)  Description: You can add care plan individualizations to a care plan. Examples of Individualization might be:  \"Parent requests to be called daily at 9am for status\", \"I have a hard time hearing out of my right ear\", or \"Do not touch me to wake me up as it startles me\".  Outcome: Met   Pt declined assessment multiple times throughout shift. Stated she would call writer when she was ready. Pt was a little tearful when writer entered room. Pt stated she felt a little emotional and guilty that she was away from her 1 year old son at home and that he has been fussy while shes been in hospital. She is excited to get home to him. Writer updated daron segundo and she rounded on pt. PPMA came back at 5. Pt currently rating pain 2/10. Writer administered tylenol and ibuprofen per pt request. Pt declined any further interventions at this time. Discharge instructions provided and reviewed. Addressed any questions and concerns. Will continue to monitor. Pt discharge home with infant and spouse.   "

## 2022-07-17 NOTE — DISCHARGE SUMMARY
OB Discharge Summary      Date:  2022    Name:  Godfrey Simms  :  1995  MRN:  [id number]      Admission Date:  7/15/2022  Delivery Date: 2022  Gestational Age at Delivery:  39w6d  Discharge Date:  2022    Principal Diagnosis:    Patient Active Problem List   Diagnosis     Pregnant     Rupture of membranes with delay of delivery       Other Diagnosis:      Conditions complicating Pregnancy:  GBS positive in urine    Procedure(s) Performed:      Indication for :  None  Indication for Induction:  None     Condition at Discharge:  Good    Discharge Medications:      Review of your medicines      START taking      Dose / Directions   docusate sodium 100 MG capsule  Commonly known as: COLACE  Used for: Normal delivery      Dose: 100-200 mg  Take 1-2 capsules (100-200 mg) by mouth 2 times daily as needed for constipation  Quantity: 30 capsule  Refills: 0     ibuprofen 800 MG tablet  Commonly known as: ADVIL/MOTRIN  Used for: Normal delivery      Dose: 800 mg  Take 1 tablet (800 mg) by mouth every 8 hours as needed for other (cramping)  Quantity: 30 tablet  Refills: 0        CONTINUE these medicines which have NOT CHANGED      Dose / Directions   acetaminophen 500 MG tablet  Commonly known as: TYLENOL      Dose: 500-1,000 mg  Take 500-1,000 mg by mouth every 6 hours as needed for mild pain  Refills: 0     PNV Prenatal Plus Multivitamin 27-1 MG Tabs per tablet      Dose: 1 tablet  Take 1 tablet by mouth daily  Refills: 0           Where to get your medicines      These medications were sent to [id number] [place] [address] [address] AT [facility] [address]  [address] [facility] [address]    Phone: [phone]     docusate sodium 100 MG capsule    ibuprofen 800 MG tablet          Discharge Plan:    Follow up with /FRED:  [facility] in 6 weeks   Patient Instructions:      Physical activity: As tolerated. Nothing in the vagina for 6  weeks.     Diet:  Regular. Drink 100 oz (3 liters) daily.     Medication:  As listed above. May alternate ibuprofen and acetaminophen for pain management.       Physician/CNM:  MARIN Acuña CNM    Name:  Godfrey Simms  :  1995  MRN:  [id number]

## 2022-11-14 ENCOUNTER — HOSPITAL ENCOUNTER (EMERGENCY)
Facility: HOSPITAL | Age: 27
Discharge: HOME | End: 2022-11-14
Attending: EMERGENCY MEDICINE | Admitting: EMERGENCY MEDICINE
Payer: COMMERCIAL

## 2022-11-14 ENCOUNTER — APPOINTMENT (OUTPATIENT)
Dept: ULTRASOUND IMAGING | Facility: HOSPITAL | Age: 27
End: 2022-11-14
Attending: EMERGENCY MEDICINE
Payer: COMMERCIAL

## 2022-11-14 VITALS
WEIGHT: 120 LBS | BODY MASS INDEX: 21.95 KG/M2 | OXYGEN SATURATION: 97 % | DIASTOLIC BLOOD PRESSURE: 71 MMHG | RESPIRATION RATE: 16 BRPM | SYSTOLIC BLOOD PRESSURE: 112 MMHG | HEART RATE: 101 BPM | TEMPERATURE: 98.6 F

## 2022-11-14 DIAGNOSIS — O03.9 MISCARRIAGE: ICD-10-CM

## 2022-11-14 LAB
ABO/RH(D): NORMAL
ALBUMIN UR-MCNC: NEGATIVE MG/DL
ANION GAP SERPL CALCULATED.3IONS-SCNC: 11 MMOL/L (ref 7–15)
ANTIBODY SCREEN: NEGATIVE
APPEARANCE UR: CLEAR
BASOPHILS # BLD AUTO: 0 10E3/UL (ref 0–0.2)
BASOPHILS NFR BLD AUTO: 1 %
BILIRUB UR QL STRIP: NEGATIVE
BUN SERPL-MCNC: 10.1 MG/DL (ref 6–20)
CALCIUM SERPL-MCNC: 9.1 MG/DL (ref 8.6–10)
CHLORIDE SERPL-SCNC: 104 MMOL/L (ref 98–107)
COLOR UR AUTO: ABNORMAL
CREAT SERPL-MCNC: 0.62 MG/DL (ref 0.51–0.95)
DEPRECATED HCO3 PLAS-SCNC: 24 MMOL/L (ref 22–29)
EOSINOPHIL # BLD AUTO: 0.2 10E3/UL (ref 0–0.7)
EOSINOPHIL NFR BLD AUTO: 3 %
ERYTHROCYTE [DISTWIDTH] IN BLOOD BY AUTOMATED COUNT: 11.9 % (ref 10–15)
GFR SERPL CREATININE-BSD FRML MDRD: >90 ML/MIN/1.73M2
GLUCOSE SERPL-MCNC: 112 MG/DL (ref 70–99)
GLUCOSE UR STRIP-MCNC: NEGATIVE MG/DL
HCG INTACT+B SERPL-ACNC: 4973 MIU/ML
HCT VFR BLD AUTO: 37.8 % (ref 35–47)
HGB BLD-MCNC: 12.5 G/DL (ref 11.7–15.7)
HGB UR QL STRIP: ABNORMAL
HYALINE CASTS: 1 /LPF
IMM GRANULOCYTES # BLD: 0 10E3/UL
IMM GRANULOCYTES NFR BLD: 1 %
KETONES UR STRIP-MCNC: NEGATIVE MG/DL
LEUKOCYTE ESTERASE UR QL STRIP: NEGATIVE
LYMPHOCYTES # BLD AUTO: 2 10E3/UL (ref 0.8–5.3)
LYMPHOCYTES NFR BLD AUTO: 30 %
MCH RBC QN AUTO: 29.8 PG (ref 26.5–33)
MCHC RBC AUTO-ENTMCNC: 33.1 G/DL (ref 31.5–36.5)
MCV RBC AUTO: 90 FL (ref 78–100)
MONOCYTES # BLD AUTO: 0.5 10E3/UL (ref 0–1.3)
MONOCYTES NFR BLD AUTO: 7 %
NEUTROPHILS # BLD AUTO: 3.9 10E3/UL (ref 1.6–8.3)
NEUTROPHILS NFR BLD AUTO: 58 %
NITRATE UR QL: NEGATIVE
NRBC # BLD AUTO: 0 10E3/UL
NRBC BLD AUTO-RTO: 0 /100
PH UR STRIP: 6 [PH] (ref 5–7)
PLATELET # BLD AUTO: 223 10E3/UL (ref 150–450)
POTASSIUM SERPL-SCNC: 3.7 MMOL/L (ref 3.4–5.3)
RBC # BLD AUTO: 4.2 10E6/UL (ref 3.8–5.2)
RBC URINE: <1 /HPF
SODIUM SERPL-SCNC: 139 MMOL/L (ref 136–145)
SP GR UR STRIP: 1.03 (ref 1–1.03)
SPECIMEN EXPIRATION DATE: NORMAL
SQUAMOUS EPITHELIAL: <1 /HPF
UROBILINOGEN UR STRIP-MCNC: <2 MG/DL
WBC # BLD AUTO: 6.6 10E3/UL (ref 4–11)
WBC URINE: 0 /HPF

## 2022-11-14 PROCEDURE — 86901 BLOOD TYPING SEROLOGIC RH(D): CPT | Performed by: EMERGENCY MEDICINE

## 2022-11-14 PROCEDURE — 86850 RBC ANTIBODY SCREEN: CPT | Performed by: EMERGENCY MEDICINE

## 2022-11-14 PROCEDURE — 81001 URINALYSIS AUTO W/SCOPE: CPT | Performed by: EMERGENCY MEDICINE

## 2022-11-14 PROCEDURE — 84702 CHORIONIC GONADOTROPIN TEST: CPT | Performed by: EMERGENCY MEDICINE

## 2022-11-14 PROCEDURE — 80048 BASIC METABOLIC PNL TOTAL CA: CPT | Performed by: EMERGENCY MEDICINE

## 2022-11-14 PROCEDURE — 99284 EMERGENCY DEPT VISIT MOD MDM: CPT | Mod: 25

## 2022-11-14 PROCEDURE — 76801 OB US < 14 WKS SINGLE FETUS: CPT

## 2022-11-14 PROCEDURE — 36415 COLL VENOUS BLD VENIPUNCTURE: CPT | Performed by: STUDENT IN AN ORGANIZED HEALTH CARE EDUCATION/TRAINING PROGRAM

## 2022-11-14 PROCEDURE — 85025 COMPLETE CBC W/AUTO DIFF WBC: CPT | Performed by: EMERGENCY MEDICINE

## 2022-11-14 ASSESSMENT — ENCOUNTER SYMPTOMS
EYE REDNESS: 0
NECK STIFFNESS: 0
ARTHRALGIAS: 0
COLOR CHANGE: 0
FEVER: 0
DIFFICULTY URINATING: 0
CONFUSION: 0
HEADACHES: 0
SHORTNESS OF BREATH: 0
ABDOMINAL PAIN: 0

## 2022-11-15 NOTE — DISCHARGE INSTRUCTIONS
As we discussed, the ultrasound tonight appears to look like a miscarriage.  The next step is for you to call [facility] tomorrow, schedule a follow-up appointment for repeat blood pregnancy testing to ensure the value is going down.

## 2022-11-15 NOTE — ED TRIAGE NOTES
Pt states she's been having intermittent vaginal bleeding since yesterday. Pt recently took a home pregnancy test which was positive. Pt has not seen an OB/GYN since discovering positive pregnancy test. Pt states she's gone through 5 pads just today. She states the blood started out dark and has slowly becoming a brighter red. Pt denies any known injury.     Triage Assessment     Row Name 11/14/22 1923       Triage Assessment (Adult)    Airway WDL WDL       Respiratory WDL    Respiratory WDL WDL       Skin Circulation/Temperature WDL    Skin Circulation/Temperature WDL WDL       Cardiac WDL    Cardiac WDL WDL       Peripheral/Neurovascular WDL    Peripheral Neurovascular WDL WDL       Cognitive/Neuro/Behavioral WDL    Cognitive/Neuro/Behavioral WDL WDL

## 2022-11-15 NOTE — ED PROVIDER NOTES
EMERGENCY DEPARTMENT ENCOUNTER     NAME: Godfrey Simms   AGE: 27 year old female   YOB: 1995   MRN: [id number]   EVALUATION DATE & TIME: No admission date for patient encounter.   PCP: No Ref-Primary, Physician     Chief Complaint   Patient presents with     Vaginal Bleeding - Pregnant   :    FINAL IMPRESSION       1. Miscarriage           ED COURSE & MEDICAL DECISION MAKING      7:52 PM I met with patient for initial interview and encounter.  10:06 PM I rechecked and updated the patient. Discussed discharge.   Pertinent Labs & Imaging studies reviewed. (See chart for details)   27 year old female  presents to the Emergency Department for evaluation of vaginal bleeding. Initial Vitals Reviewed. Initial exam notable for generally well-appearing patient in no acute distress.  She is hemodynamically stable.  She has had several miscarriages, LMP would place her at about 12 weeks pregnant and she has not yet sought OB care this pregnancy.  I considered miscarriage, threatened miscarriage, subchorionic hematoma as she would be more like 12 weeks by dates, less likely ectopic pregnancy given her being further along poor dates as she had a positive pregnancy test all the way back in August.  Labs show beta hCG of only 4000, and an ultrasound shows an intrauterine pregnancy with a gestational sac, but there is no fetal heart tones, it appears abnormal, and radiology is saying this likely represents pregnancy failure.  Given that it started out as spotting and now she started having heavier bleeding it is consistent with likely miscarriage.  I discussed this with the patient and she typically follows with [facility] for her pregnancy care.  I do think she needs a 48-hour beta-hCG to ensure it is dropping appropriately, and I am going to have her call tomorrow to schedule this.  Return precautions given and she is comfortable with the discharge plan and likely explanation.           At the conclusion  of the encounter I discussed the results of all of the tests and the disposition. The questions were answered. The patient or family acknowledged understanding and was agreeable with the care plan.     0 minutes critical care time, see procedure note below for details if relevant    MEDICATIONS GIVEN IN THE EMERGENCY:   Medications - No data to display   NEW PRESCRIPTIONS STARTED AT TODAY'S ER VISIT   New Prescriptions    No medications on file     ================================================================   HISTORY OF PRESENT ILLNESS       Patient information was obtained from: Patient   Use of Intrepreter: N/A   Godfrey Simms is a 27 year old female with history of miscarriage who presents vaginal bleeding.    Patient is  and reports vaginal bleeding that started yesterday afternoon. Initially it was just spotting but late in the night it got a little more heavier and bright red. Her last menstrual cycle was . She did her pregnancy test at the end of august. Patient denies any other complaints.     ================================================================    REVIEW OF SYSTEMS       Review of Systems   Constitutional: Negative for fever.   HENT: Negative for congestion.    Eyes: Negative for redness.   Respiratory: Negative for shortness of breath.    Cardiovascular: Negative for chest pain.   Gastrointestinal: Negative for abdominal pain.   Genitourinary: Positive for vaginal bleeding. Negative for difficulty urinating.   Musculoskeletal: Negative for arthralgias and neck stiffness.   Skin: Negative for color change.   Neurological: Negative for headaches.   Psychiatric/Behavioral: Negative for confusion.   All other systems reviewed and are negative.       PAST HISTORY     PAST MEDICAL HISTORY:   History reviewed. No pertinent past medical history.   PAST SURGICAL HISTORY:   Past Surgical History:   Procedure Laterality Date     IA HYSTEROSCOPY,DX,SEP PROC N/A 10/21/2019    Procedure:  HYSTEROSCOPY INTRAUTERINE DEVICE REMOVAL;  Surgeon: Hayley Saavedra DO;  Location: [facility];  Service: Gynecology     TN REMOVE INTRAUTERINE DEVICE N/A 10/21/2019    Procedure: INTRAUTERINE DEVICE REMOVAL;  Surgeon: Hayley Saavedra DO;  Location: [facility];  Service: Gynecology      CURRENT MEDICATIONS:   acetaminophen (TYLENOL) 500 MG tablet  docusate sodium (COLACE) 100 MG capsule  ibuprofen (ADVIL/MOTRIN) 800 MG tablet  Prenatal Vit-Fe Fumarate-FA (PNV PRENATAL PLUS MULTIVITAMIN) 27-1 MG TABS per tablet      ALLERGIES:   No Known Allergies   FAMILY HISTORY:   No family history on file.   SOCIAL HISTORY:   Social History     Socioeconomic History     Marital status: Single   Tobacco Use     Smoking status: Former     Packs/day: 0.00     Types: Cigarettes     Smokeless tobacco: Never   Substance and Sexual Activity     Alcohol use: Never     Drug use: Never     Sexual activity: Yes     Partners: Male        VITALS  Patient Vitals for the past 24 hrs:   BP Temp Temp src Pulse Resp SpO2 Weight   11/14/22 1922 120/74 98.6  F (37  C) Oral 94 16 96 % 54.4 kg (120 lb)        ================================================================    PHYSICAL EXAM     VITAL SIGNS: /74   Pulse 94   Temp 98.6  F (37  C) (Oral)   Resp 16   Wt 54.4 kg (120 lb)   SpO2 96%   BMI 21.95 kg/m     Constitutional:  Awake, no acute distress   HENT:  Atraumatic, oropharynx without exudate or erythema, membranes moist  Lymph:  No adenopathy  Eyes: EOM intact, PERRL, no injection  Neck: Supple  Respiratory:  Clear to auscultation bilaterally, no wheezes or crackles   Cardiovascular:  Regular rate and rhythm, single S1 and S2   GI:  Soft, nontender, nondistended, no rebound or guarding   Musculoskeletal:  Moves all extremities, no lower extremity edema, no deformities    Skin:  Warm, dry  Neurologic:  Alert and oriented x3, no focal deficits noted        ================================================================  LAB       All pertinent labs reviewed and interpreted.   Labs Ordered and Resulted from Time of ED Arrival to Time of ED Departure   ROUTINE UA WITH MICROSCOPIC REFLEX TO CULTURE - Abnormal       Result Value    Color Urine Light Yellow      Appearance Urine Clear      Glucose Urine Negative      Bilirubin Urine Negative      Ketones Urine Negative      Specific Gravity Urine 1.026      Blood Urine 0.03 mg/dL (*)     pH Urine 6.0      Protein Albumin Urine Negative      Urobilinogen Urine <2.0      Nitrite Urine Negative      Leukocyte Esterase Urine Negative      RBC Urine <1      WBC Urine 0      Squamous Epithelials Urine <1      Hyaline Casts Urine 1     BASIC METABOLIC PANEL - Abnormal    Sodium 139      Potassium 3.7      Chloride 104      Carbon Dioxide (CO2) 24      Anion Gap 11      Urea Nitrogen 10.1      Creatinine 0.62      Calcium 9.1      Glucose 112 (*)     GFR Estimate >90     HCG QUANTITATIVE PREGNANCY - Abnormal    hCG Quantitative 4,973 (*)    CBC WITH PLATELETS AND DIFFERENTIAL    WBC Count 6.6      RBC Count 4.20      Hemoglobin 12.5      Hematocrit 37.8      MCV 90      MCH 29.8      MCHC 33.1      RDW 11.9      Platelet Count 223      % Neutrophils 58      % Lymphocytes 30      % Monocytes 7      % Eosinophils 3      % Basophils 1      % Immature Granulocytes 1      NRBCs per 100 WBC 0      Absolute Neutrophils 3.9      Absolute Lymphocytes 2.0      Absolute Monocytes 0.5      Absolute Eosinophils 0.2      Absolute Basophils 0.0      Absolute Immature Granulocytes 0.0      Absolute NRBCs 0.0     TYPE AND SCREEN, ADULT    ABO/RH(D) B POS      Antibody Screen Negative      SPECIMEN EXPIRATION DATE [id number]     ABO/RH TYPE AND SCREEN        ===============================================================  RADIOLOGY       Reviewed all pertinent imaging. Please see official radiology report.   OB  US 1st trimester w  transvag   Final Result   IMPRESSION:    1.  Single large intrauterine gestational sac with small peripheral echogenic focus, could represent atypical yolk sac or embryo. No cardiac activity visualized. Overall, findings are suspicious for pregnancy failure. Recommend short-term sonographic and    beta hCG follow-up.                  ================================================================  EKG         I have independently reviewed and interpreted the EKG(s) documented above.     ================================================================  PROCEDURES         I, Madelyn Geronimo, am serving as a scribe to document services personally performed by Dr. Neff based on my observation and the provider's statements to me. I, Magda Neff MD attest that Madelyn Geronimo is acting in a scribe capacity, has observed my performance of the services and has documented them in accordance with my direction.   Magda Neff M.D.   Emergency Medicine   [facility] EMERGENCY DEPARTMENT  [facility] [facility] [address]  [phone]  Dept: [phone]      Magda Neff MD  11/14/22 3554

## 2023-04-12 LAB
ABO (EXTERNAL): NORMAL
HEPATITIS B SURFACE ANTIGEN (EXTERNAL): NONREACTIVE
HEPATITIS C ANTIBODY (EXTERNAL): NONREACTIVE
HIV1+2 AB SERPL QL IA: NONREACTIVE
RH (EXTERNAL): POSITIVE
RUBELLA ANTIBODY IGG (EXTERNAL): NORMAL
TREPONEMA PALLIDUM ANTIBODY (EXTERNAL): NONREACTIVE

## 2023-08-12 ENCOUNTER — HEALTH MAINTENANCE LETTER (OUTPATIENT)
Age: 28
End: 2023-08-12

## 2023-10-01 LAB — GROUP B STREPTOCOCCUS (EXTERNAL): POSITIVE

## 2023-10-18 ENCOUNTER — HOSPITAL ENCOUNTER (OUTPATIENT)
Facility: HOSPITAL | Age: 28
End: 2023-10-18
Admitting: ADVANCED PRACTICE MIDWIFE
Payer: COMMERCIAL

## 2023-10-22 PROBLEM — Z34.90 ENCOUNTER FOR INDUCTION OF LABOR: Status: ACTIVE | Noted: 2023-10-22

## 2024-10-05 ENCOUNTER — HEALTH MAINTENANCE LETTER (OUTPATIENT)
Age: 29
End: 2024-10-05